# Patient Record
Sex: MALE | NOT HISPANIC OR LATINO | URBAN - METROPOLITAN AREA
[De-identification: names, ages, dates, MRNs, and addresses within clinical notes are randomized per-mention and may not be internally consistent; named-entity substitution may affect disease eponyms.]

---

## 2017-09-11 ENCOUNTER — OFFICE VISIT (OUTPATIENT)
Dept: INTERNAL MEDICINE | Facility: CLINIC | Age: 42
End: 2017-09-11

## 2017-09-11 VITALS
WEIGHT: 194 LBS | TEMPERATURE: 97.9 F | SYSTOLIC BLOOD PRESSURE: 126 MMHG | DIASTOLIC BLOOD PRESSURE: 84 MMHG | HEIGHT: 66 IN | BODY MASS INDEX: 31.18 KG/M2

## 2017-09-11 DIAGNOSIS — F41.9 ANXIETY AND DEPRESSION: Primary | ICD-10-CM

## 2017-09-11 DIAGNOSIS — F41.0 PANIC ATTACK: ICD-10-CM

## 2017-09-11 DIAGNOSIS — F32.A ANXIETY AND DEPRESSION: Primary | ICD-10-CM

## 2017-09-11 PROCEDURE — 99214 OFFICE O/P EST MOD 30 MIN: CPT | Performed by: INTERNAL MEDICINE

## 2017-09-11 RX ORDER — HYDROXYZINE PAMOATE 25 MG/1
25 CAPSULE ORAL DAILY PRN
Qty: 30 CAPSULE | Refills: 2 | Status: SHIPPED | OUTPATIENT
Start: 2017-09-11 | End: 2017-12-01 | Stop reason: SDUPTHER

## 2017-09-11 RX ORDER — BUPROPION HYDROCHLORIDE 150 MG/1
150 TABLET ORAL DAILY
Qty: 30 TABLET | Refills: 2 | Status: SHIPPED | OUTPATIENT
Start: 2017-09-11 | End: 2017-12-01
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
## 2017-09-11 NOTE — PATIENT INSTRUCTIONS
Hydroxyzine capsules or tablets  What is this medicine?  HYDROXYZINE (nia DROX i zeen) is an antihistamine. This medicine is used to treat allergy symptoms. It is also used to treat anxiety and tension. This medicine can be used with other medicines to induce sleep before surgery.  This medicine may be used for other purposes; ask your health care provider or pharmacist if you have questions.  COMMON BRAND NAME(S): ANX, Atarax, Rezine, Vistaril  What should I tell my health care provider before I take this medicine?  They need to know if you have any of these conditions:  -any chronic illness  -difficulty passing urine  -glaucoma  -heart disease  -kidney disease  -liver disease  -lung disease  -an unusual or allergic reaction to hydroxyzine, cetirizine, other medicines, foods, dyes, or preservatives  -pregnant or trying to get pregnant  -breast-feeding  How should I use this medicine?  Take this medicine by mouth with a full glass of water. Follow the directions on the prescription label. You may take this medicine with food or on an empty stomach. Take your medicine at regular intervals. Do not take your medicine more often than directed.  Talk to your pediatrician regarding the use of this medicine in children. Special care may be needed. While this drug may be prescribed for children as young as 6 years of age for selected conditions, precautions do apply.  Patients over 65 years old may have a stronger reaction and need a smaller dose.  Overdosage: If you think you have taken too much of this medicine contact a poison control center or emergency room at once.  NOTE: This medicine is only for you. Do not share this medicine with others.  What if I miss a dose?  If you miss a dose, take it as soon as you can. If it is almost time for your next dose, take only that dose. Do not take double or extra doses.  What may interact with this medicine?  -alcohol  -barbiturate medicines for sleep or seizures  -medicines for  colds, allergies  -medicines for depression, anxiety, or emotional disturbances  -medicines for pain  -medicines for sleep  -muscle relaxants  This list may not describe all possible interactions. Give your health care provider a list of all the medicines, herbs, non-prescription drugs, or dietary supplements you use. Also tell them if you smoke, drink alcohol, or use illegal drugs. Some items may interact with your medicine.  What should I watch for while using this medicine?  Tell your doctor or health care professional if your symptoms do not improve.  You may get drowsy or dizzy. Do not drive, use machinery, or do anything that needs mental alertness until you know how this medicine affects you. Do not stand or sit up quickly, especially if you are an older patient. This reduces the risk of dizzy or fainting spells. Alcohol may interfere with the effect of this medicine. Avoid alcoholic drinks.  Your mouth may get dry. Chewing sugarless gum or sucking hard candy, and drinking plenty of water may help. Contact your doctor if the problem does not go away or is severe.  This medicine may cause dry eyes and blurred vision. If you wear contact lenses you may feel some discomfort. Lubricating drops may help. See your eye doctor if the problem does not go away or is severe.  If you are receiving skin tests for allergies, tell your doctor you are using this medicine.  What side effects may I notice from receiving this medicine?  Side effects that you should report to your doctor or health care professional as soon as possible:  -fast or irregular heartbeat  -difficulty passing urine  -seizures  -slurred speech or confusion  -tremor  Side effects that usually do not require medical attention (report to your doctor or health care professional if they continue or are bothersome):  -constipation  -drowsiness  -fatigue  -headache  -stomach upset  This list may not describe all possible side effects. Call your doctor for  medical advice about side effects. You may report side effects to FDA at 0-598-TEX-0666.  Where should I keep my medicine?  Keep out of the reach of children.  Store at room temperature between 15 and 30 degrees C (59 and 86 degrees F). Keep container tightly closed. Throw away any unused medicine after the expiration date.  NOTE: This sheet is a summary. It may not cover all possible information. If you have questions about this medicine, talk to your doctor, pharmacist, or health care provider.     © 2017, [employer]. (2009-05-01 14:50:59)

## 2017-10-23 ENCOUNTER — OFFICE VISIT (OUTPATIENT)
Dept: INTERNAL MEDICINE | Facility: CLINIC | Age: 42
End: 2017-10-23

## 2017-10-23 VITALS
TEMPERATURE: 98 F | BODY MASS INDEX: 31.02 KG/M2 | DIASTOLIC BLOOD PRESSURE: 68 MMHG | WEIGHT: 193 LBS | SYSTOLIC BLOOD PRESSURE: 117 MMHG | HEIGHT: 66 IN

## 2017-10-23 DIAGNOSIS — F32.A ANXIETY AND DEPRESSION: Primary | ICD-10-CM

## 2017-10-23 DIAGNOSIS — F41.9 ANXIETY AND DEPRESSION: Primary | ICD-10-CM

## 2017-10-23 DIAGNOSIS — F41.0 PANIC ATTACK: ICD-10-CM

## 2017-10-23 PROCEDURE — 99214 OFFICE O/P EST MOD 30 MIN: CPT | Performed by: INTERNAL MEDICINE

## 2017-10-23 RX ORDER — BUSPIRONE HYDROCHLORIDE 10 MG/1
10 TABLET ORAL 2 TIMES DAILY
Qty: 60 TABLET | Refills: 2 | Status: SHIPPED | OUTPATIENT
Start: 2017-10-23 | End: 2017-12-01 | Stop reason: SDUPTHER
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
## 2017-10-23 NOTE — PATIENT INSTRUCTIONS
Buspirone tablets  What is this medicine?  BUSPIRONE (byoo SKYLER aldana) is used to treat anxiety disorders.  This medicine may be used for other purposes; ask your health care provider or pharmacist if you have questions.  COMMON BRAND NAME(S): Lilly  What should I tell my health care provider before I take this medicine?  They need to know if you have any of these conditions:  -kidney or liver disease  -an unusual or allergic reaction to buspirone, other medicines, foods, dyes, or preservatives  -pregnant or trying to get pregnant  -breast-feeding  How should I use this medicine?  Take this medicine by mouth with a glass of water. Follow the directions on the prescription label. You may take this medicine with or without food. To ensure that this medicine always works the same way for you, you should take it either always with or always without food. Take your doses at regular intervals. Do not take your medicine more often than directed. Do not stop taking except on the advice of your doctor or health care professional.  Talk to your pediatrician regarding the use of this medicine in children. Special care may be needed.  Overdosage: If you think you have taken too much of this medicine contact a poison control center or emergency room at once.  NOTE: This medicine is only for you. Do not share this medicine with others.  What if I miss a dose?  If you miss a dose, take it as soon as you can. If it is almost time for your next dose, take only that dose. Do not take double or extra doses.  What may interact with this medicine?  Do not take this medicine with any of the following medications:  -linezolid  -MAOIs like Carbex, Eldepryl, Marplan, Nardil, and Parnate  -methylene blue  -procarbazine  This medicine may also interact with the following medications:  -diazepam  -digoxin  -diltiazem  -erythromycin  -grapefruit juice  -haloperidol  -medicines for mental depression or mood problems  -medicines for seizures like  carbamazepine, phenobarbital and phenytoin  -nefazodone  -other medications for anxiety  -rifampin  -ritonavir  -some antifungal medicines like itraconazole, ketoconazole, and voriconazole  -verapamil  -warfarin  This list may not describe all possible interactions. Give your health care provider a list of all the medicines, herbs, non-prescription drugs, or dietary supplements you use. Also tell them if you smoke, drink alcohol, or use illegal drugs. Some items may interact with your medicine.  What should I watch for while using this medicine?  Visit your doctor or health care professional for regular checks on your progress. It may take 1 to 2 weeks before your anxiety gets better.  You may get drowsy or dizzy. Do not drive, use machinery, or do anything that needs mental alertness until you know how this drug affects you. Do not stand or sit up quickly, especially if you are an older patient. This reduces the risk of dizzy or fainting spells. Alcohol can make you more drowsy and dizzy. Avoid alcoholic drinks.  What side effects may I notice from receiving this medicine?  Side effects that you should report to your doctor or health care professional as soon as possible:  -blurred vision or other vision changes  -chest pain  -confusion  -difficulty breathing  -feelings of hostility or anger  -muscle aches and pains  -numbness or tingling in hands or feet  -ringing in the ears  -skin rash and itching  -vomiting  -weakness  Side effects that usually do not require medical attention (report to your doctor or health care professional if they continue or are bothersome):  -disturbed dreams, nightmares  -headache  -nausea  -restlessness or nervousness  -sore throat and nasal congestion  -stomach upset  This list may not describe all possible side effects. Call your doctor for medical advice about side effects. You may report side effects to FDA at 5-853-FDA-1499.  Where should I keep my medicine?  Keep out of the reach  of children.  Store at room temperature below 30 degrees C (86 degrees F). Protect from light. Keep container tightly closed. Throw away any unused medicine after the expiration date.  NOTE: This sheet is a summary. It may not cover all possible information. If you have questions about this medicine, talk to your doctor, pharmacist, or health care provider.     © 2017, [employer]. (2011-07-28 18:06:11)

## 2017-12-01 ENCOUNTER — OFFICE VISIT (OUTPATIENT)
Dept: INTERNAL MEDICINE | Facility: CLINIC | Age: 42
End: 2017-12-01

## 2017-12-01 VITALS
TEMPERATURE: 98.1 F | SYSTOLIC BLOOD PRESSURE: 117 MMHG | BODY MASS INDEX: 31.98 KG/M2 | WEIGHT: 199 LBS | HEIGHT: 66 IN | DIASTOLIC BLOOD PRESSURE: 68 MMHG

## 2017-12-01 DIAGNOSIS — F32.A ANXIETY AND DEPRESSION: Primary | ICD-10-CM

## 2017-12-01 DIAGNOSIS — F41.0 PANIC ATTACK: ICD-10-CM

## 2017-12-01 DIAGNOSIS — F41.9 ANXIETY AND DEPRESSION: Primary | ICD-10-CM

## 2017-12-01 DIAGNOSIS — Z23 INFLUENZA VACCINATION ADMINISTERED AT CURRENT VISIT: ICD-10-CM

## 2017-12-01 PROCEDURE — 90471 IMMUNIZATION ADMIN: CPT | Performed by: INTERNAL MEDICINE

## 2017-12-01 PROCEDURE — 90686 IIV4 VACC NO PRSV 0.5 ML IM: CPT | Performed by: INTERNAL MEDICINE

## 2017-12-01 PROCEDURE — 99214 OFFICE O/P EST MOD 30 MIN: CPT | Performed by: INTERNAL MEDICINE

## 2017-12-01 RX ORDER — BUPROPION HYDROCHLORIDE 300 MG/1
300 TABLET ORAL DAILY
Qty: 90 TABLET | Refills: 2 | Status: SHIPPED | OUTPATIENT
Start: 2017-12-01 | End: 2018-09-23 | Stop reason: SDUPTHER

## 2017-12-01 RX ORDER — BUSPIRONE HYDROCHLORIDE 10 MG/1
10 TABLET ORAL 2 TIMES DAILY
Qty: 180 TABLET | Refills: 2 | Status: SHIPPED | OUTPATIENT
Start: 2017-12-01

## 2017-12-01 RX ORDER — HYDROXYZINE PAMOATE 25 MG/1
25 CAPSULE ORAL DAILY PRN
Qty: 90 CAPSULE | Refills: 2 | Status: SHIPPED | OUTPATIENT
Start: 2017-12-01 | End: 2018-12-04

## 2017-12-01 NOTE — PROGRESS NOTES
"Subjective   Hiram Arreguin is a 42 y.o. male here for follow-up A&D. Says mood overall feels stable, depression is stable, anxiety is still present but improving. He has bad days some times. Thinks wellbutrin could be increased a bit for further mood life. He has had no negative SE from these meds. Work is still a stressor. Father dx with colon cancer (or maybe metastatic prostate cancer to the colon?) at 65 yo and he is wondering when he should start getting screened for cancer. Wants flu vaccine today.     The following portions of the patient's history were reviewed and updated as appropriate: allergies, current medications, past family history, past medical history, past social history and problem list.     Family: colon cancer in father diagnosed at 65yo    Review of Systems:  General: negative  CV: negative  Neuro: negative  Psych: A&D    Objective   /68 (BP Location: Right arm, Patient Position: Sitting, Cuff Size: Adult)  Temp 98.1 °F (36.7 °C) (Temporal Artery )   Ht 66\" (167.6 cm)  Wt 199 lb (90.3 kg)  BMI 32.12 kg/m2    Physical Exam   Constitutional: He is oriented to person, place, and time. He appears well-developed and well-nourished.   Pulmonary/Chest: Effort normal.   Neurological: He is alert and oriented to person, place, and time.   Skin: Skin is warm and dry.   Psychiatric: He has a normal mood and affect. His behavior is normal. Judgment and thought content normal.   Vitals reviewed.      Assessment/Plan   Hiram was seen today for anxiety and depression.    Diagnoses and all orders for this visit:    Anxiety and depression  -     buPROPion XL (WELLBUTRIN XL) 300 MG 24 hr tablet; Take 1 tablet by mouth Daily. Increased dose, hopefully will lift mood a bit more  -     busPIRone (BUSPAR) 10 MG tablet; Take 1 tablet by mouth 2 (Two) Times a Day.   -     hydrOXYzine (VISTARIL) 25 MG capsule; Take 1 capsule by mouth Daily As Needed for Anxiety.    Panic attack  -     buPROPion XL " (WELLBUTRIN XL) 300 MG 24 hr tablet; Take 1 tablet by mouth Daily.  -     busPIRone (BUSPAR) 10 MG tablet; Take 1 tablet by mouth 2 (Two) Times a Day.  -     hydrOXYzine (VISTARIL) 25 MG capsule; Take 1 capsule by mouth Daily As Needed for Anxiety.    Influenza vaccination administered at current visit  -     Flu Vaccine Quad PF 3YR+ (FLUARIX [id number])

## 2017-12-01 NOTE — PATIENT INSTRUCTIONS
Buspirone tablets  What is this medicine?  BUSPIRONE (byoo SKYLER aldana) is used to treat anxiety disorders.  This medicine may be used for other purposes; ask your health care provider or pharmacist if you have questions.  COMMON BRAND NAME(S): Lilly  What should I tell my health care provider before I take this medicine?  They need to know if you have any of these conditions:  -kidney or liver disease  -an unusual or allergic reaction to buspirone, other medicines, foods, dyes, or preservatives  -pregnant or trying to get pregnant  -breast-feeding  How should I use this medicine?  Take this medicine by mouth with a glass of water. Follow the directions on the prescription label. You may take this medicine with or without food. To ensure that this medicine always works the same way for you, you should take it either always with or always without food. Take your doses at regular intervals. Do not take your medicine more often than directed. Do not stop taking except on the advice of your doctor or health care professional.  Talk to your pediatrician regarding the use of this medicine in children. Special care may be needed.  Overdosage: If you think you have taken too much of this medicine contact a poison control center or emergency room at once.  NOTE: This medicine is only for you. Do not share this medicine with others.  What if I miss a dose?  If you miss a dose, take it as soon as you can. If it is almost time for your next dose, take only that dose. Do not take double or extra doses.  What may interact with this medicine?  Do not take this medicine with any of the following medications:  -linezolid  -MAOIs like Carbex, Eldepryl, Marplan, Nardil, and Parnate  -methylene blue  -procarbazine  This medicine may also interact with the following medications:  -diazepam  -digoxin  -diltiazem  -erythromycin  -grapefruit juice  -haloperidol  -medicines for mental depression or mood problems  -medicines for seizures like  carbamazepine, phenobarbital and phenytoin  -nefazodone  -other medications for anxiety  -rifampin  -ritonavir  -some antifungal medicines like itraconazole, ketoconazole, and voriconazole  -verapamil  -warfarin  This list may not describe all possible interactions. Give your health care provider a list of all the medicines, herbs, non-prescription drugs, or dietary supplements you use. Also tell them if you smoke, drink alcohol, or use illegal drugs. Some items may interact with your medicine.  What should I watch for while using this medicine?  Visit your doctor or health care professional for regular checks on your progress. It may take 1 to 2 weeks before your anxiety gets better.  You may get drowsy or dizzy. Do not drive, use machinery, or do anything that needs mental alertness until you know how this drug affects you. Do not stand or sit up quickly, especially if you are an older patient. This reduces the risk of dizzy or fainting spells. Alcohol can make you more drowsy and dizzy. Avoid alcoholic drinks.  What side effects may I notice from receiving this medicine?  Side effects that you should report to your doctor or health care professional as soon as possible:  -blurred vision or other vision changes  -chest pain  -confusion  -difficulty breathing  -feelings of hostility or anger  -muscle aches and pains  -numbness or tingling in hands or feet  -ringing in the ears  -skin rash and itching  -vomiting  -weakness  Side effects that usually do not require medical attention (report to your doctor or health care professional if they continue or are bothersome):  -disturbed dreams, nightmares  -headache  -nausea  -restlessness or nervousness  -sore throat and nasal congestion  -stomach upset  This list may not describe all possible side effects. Call your doctor for medical advice about side effects. You may report side effects to FDA at 0-667-FDA-1139.  Where should I keep my medicine?  Keep out of the reach  of children.  Store at room temperature below 30 degrees C (86 degrees F). Protect from light. Keep container tightly closed. Throw away any unused medicine after the expiration date.  NOTE: This sheet is a summary. It may not cover all possible information. If you have questions about this medicine, talk to your doctor, pharmacist, or health care provider.     © 2017, [employer]. (2011-07-28 18:06:11)

## 2018-09-18 ENCOUNTER — OFFICE VISIT (OUTPATIENT)
Dept: INTERNAL MEDICINE | Facility: CLINIC | Age: 43
End: 2018-09-18

## 2018-09-18 VITALS — BODY MASS INDEX: 29.09 KG/M2 | TEMPERATURE: 97.6 F | HEIGHT: 66 IN | WEIGHT: 181 LBS

## 2018-09-18 DIAGNOSIS — F51.04 CHRONIC INSOMNIA: Primary | ICD-10-CM

## 2018-09-18 DIAGNOSIS — Z23 INFLUENZA VACCINATION ADMINISTERED AT CURRENT VISIT: ICD-10-CM

## 2018-09-18 PROCEDURE — 90686 IIV4 VACC NO PRSV 0.5 ML IM: CPT | Performed by: INTERNAL MEDICINE

## 2018-09-18 PROCEDURE — 99213 OFFICE O/P EST LOW 20 MIN: CPT | Performed by: INTERNAL MEDICINE

## 2018-09-18 PROCEDURE — 90471 IMMUNIZATION ADMIN: CPT | Performed by: INTERNAL MEDICINE

## 2018-09-18 RX ORDER — TRAZODONE HYDROCHLORIDE 50 MG/1
50 TABLET ORAL NIGHTLY
Qty: 30 TABLET | Refills: 5 | Status: SHIPPED | OUTPATIENT
Start: 2018-09-18

## 2018-09-18 NOTE — PROGRESS NOTES
"Subjective   Hiram Arreguin is a 43 y.o. male here for insomnia which he has suffered with chronically. However, last 2 months he is only getting about 4 hours of sleep; generally does well with 6 hours. He doesn't feel tired. He has difficulty going to sleep. Lays down at 10pm, doesn't get to sleep until 1am though he turns lights out, no devices, listens to soothing music. He then wakes at 5am with no alarm wide awake. He is afraid that long term this is going to affect him. Has taken melatonin, vistaril with no effect. Long ago tried ambien but doesn't want to take that. Feels his mood is great.       The following portions of the patient's history were reviewed and updated as appropriate: allergies, current medications, past medical history, past social history and problem list.    Review of Systems:  General: negative  Neuro: insomnia  Psych: negative    Objective   Temp 97.6 °F (36.4 °C) (Temporal Artery )   Ht 167.6 cm (66\")   Wt 82.1 kg (181 lb)   BMI 29.21 kg/m²     Physical Exam   Constitutional: He is oriented to person, place, and time. He appears well-developed and well-nourished.   Pulmonary/Chest: Effort normal.   Neurological: He is alert and oriented to person, place, and time.   Skin: Skin is warm and dry.   Psychiatric: He has a normal mood and affect. His behavior is normal. Judgment and thought content normal.   Vitals reviewed.      Assessment/Plan   Hiram was seen today for insomnia.    Diagnoses and all orders for this visit:    Chronic insomnia  -     traZODone (DESYREL) 50 MG tablet; Take 1 tablet by mouth Every Night. New med. Discussed common SE, what to expect. Discussed priapism risk and if erection >4h go to ED  -     Discussed other options such as vistaril, melatonin, seroquel, in addition to trazodone. Will go with trazodone.    Influenza vaccination administered at current visit  -     Fluarix/Fluzone/Afluria Quad>6 mo ([id number])           "

## 2018-09-23 DIAGNOSIS — F41.9 ANXIETY AND DEPRESSION: ICD-10-CM

## 2018-09-23 DIAGNOSIS — F41.0 PANIC ATTACK: ICD-10-CM

## 2018-09-23 DIAGNOSIS — F32.A ANXIETY AND DEPRESSION: ICD-10-CM

## 2018-09-24 RX ORDER — BUPROPION HYDROCHLORIDE 300 MG/1
TABLET ORAL
Qty: 90 TABLET | Refills: 2 | Status: SHIPPED | OUTPATIENT
Start: 2018-09-24 | End: 2018-12-04

## 2018-12-04 ENCOUNTER — OFFICE VISIT (OUTPATIENT)
Dept: INTERNAL MEDICINE | Facility: CLINIC | Age: 43
End: 2018-12-04

## 2018-12-04 VITALS
DIASTOLIC BLOOD PRESSURE: 70 MMHG | BODY MASS INDEX: 30.05 KG/M2 | TEMPERATURE: 97.8 F | WEIGHT: 187 LBS | SYSTOLIC BLOOD PRESSURE: 122 MMHG | HEIGHT: 66 IN

## 2018-12-04 DIAGNOSIS — F41.9 ANXIETY AND DEPRESSION: Primary | ICD-10-CM

## 2018-12-04 DIAGNOSIS — F41.0 PANIC ATTACK: ICD-10-CM

## 2018-12-04 DIAGNOSIS — F32.A ANXIETY AND DEPRESSION: Primary | ICD-10-CM

## 2018-12-04 DIAGNOSIS — F51.04 CHRONIC INSOMNIA: ICD-10-CM

## 2018-12-04 LAB
25(OH)D3 SERPL-MCNC: 13.8 NG/ML
ALBUMIN SERPL-MCNC: 4.7 G/DL (ref 3.2–4.8)
ALBUMIN/GLOB SERPL: 1.7 G/DL (ref 1.5–2.5)
ALP SERPL-CCNC: 69 U/L (ref 25–100)
ALT SERPL W P-5'-P-CCNC: 40 U/L (ref 7–40)
ANION GAP SERPL CALCULATED.3IONS-SCNC: 5 MMOL/L (ref 3–11)
AST SERPL-CCNC: 31 U/L (ref 0–33)
BILIRUB SERPL-MCNC: 0.9 MG/DL (ref 0.3–1.2)
BUN BLD-MCNC: 13 MG/DL (ref 9–23)
BUN/CREAT SERPL: 12.7 (ref 7–25)
CALCIUM SPEC-SCNC: 9 MG/DL (ref 8.7–10.4)
CHLORIDE SERPL-SCNC: 108 MMOL/L (ref 99–109)
CO2 SERPL-SCNC: 30 MMOL/L (ref 20–31)
CREAT BLD-MCNC: 1.02 MG/DL (ref 0.6–1.3)
DEPRECATED RDW RBC AUTO: 47.4 FL (ref 37–54)
ERYTHROCYTE [DISTWIDTH] IN BLOOD BY AUTOMATED COUNT: 13.5 % (ref 11.3–14.5)
GFR SERPL CREATININE-BSD FRML MDRD: 80 ML/MIN/1.73
GFR SERPL CREATININE-BSD FRML MDRD: 97 ML/MIN/1.73
GLOBULIN UR ELPH-MCNC: 2.7 GM/DL
GLUCOSE BLD-MCNC: 85 MG/DL (ref 70–100)
HCT VFR BLD AUTO: 44.1 % (ref 38.9–50.9)
HGB BLD-MCNC: 14.4 G/DL (ref 13.1–17.5)
MCH RBC QN AUTO: 31.4 PG (ref 27–31)
MCHC RBC AUTO-ENTMCNC: 32.7 G/DL (ref 32–36)
MCV RBC AUTO: 96.3 FL (ref 80–99)
PLATELET # BLD AUTO: 178 10*3/MM3 (ref 150–450)
PMV BLD AUTO: 9.4 FL (ref 6–12)
POTASSIUM BLD-SCNC: 3.8 MMOL/L (ref 3.5–5.5)
PROT SERPL-MCNC: 7.4 G/DL (ref 5.7–8.2)
RBC # BLD AUTO: 4.58 10*6/MM3 (ref 4.2–5.76)
SODIUM BLD-SCNC: 143 MMOL/L (ref 132–146)
TSH SERPL DL<=0.05 MIU/L-ACNC: 0.63 MIU/ML (ref 0.35–5.35)
VIT B12 BLD-MCNC: 519 PG/ML (ref 211–911)
WBC NRBC COR # BLD: 6.16 10*3/MM3 (ref 3.5–10.8)

## 2018-12-04 PROCEDURE — 80053 COMPREHEN METABOLIC PANEL: CPT | Performed by: INTERNAL MEDICINE

## 2018-12-04 PROCEDURE — 99214 OFFICE O/P EST MOD 30 MIN: CPT | Performed by: INTERNAL MEDICINE

## 2018-12-04 PROCEDURE — 85027 COMPLETE CBC AUTOMATED: CPT | Performed by: INTERNAL MEDICINE

## 2018-12-04 PROCEDURE — 84443 ASSAY THYROID STIM HORMONE: CPT | Performed by: INTERNAL MEDICINE

## 2018-12-04 PROCEDURE — 82306 VITAMIN D 25 HYDROXY: CPT | Performed by: INTERNAL MEDICINE

## 2018-12-04 PROCEDURE — 82607 VITAMIN B-12: CPT | Performed by: INTERNAL MEDICINE

## 2018-12-04 RX ORDER — BUPROPION HYDROCHLORIDE 150 MG/1
150 TABLET ORAL DAILY
Qty: 30 TABLET | Refills: 2 | Status: SHIPPED | OUTPATIENT
Start: 2018-12-04

## 2018-12-04 RX ORDER — HYDROXYZINE PAMOATE 25 MG/1
25-50 CAPSULE ORAL DAILY PRN
Qty: 60 CAPSULE | Refills: 0 | Status: SHIPPED | OUTPATIENT
Start: 2018-12-04

## 2018-12-04 NOTE — PROGRESS NOTES
"Subjective   Hiram Arreguin is a 43 y.o. male here for follow-up A&D, panic attack, and insomnia. He has difficulty characterizing his sx. He says last week he felt \"something coming on\" and he woke the next day and called in to work. He felt very depressed. He just wanted to be alone and laid on the couch. He didn't work the entire week. He even felt so disconnected that he thought perhaps he should just go on disability because he can't deal with what's going on with him. 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 He also felt anxious. No panic attacks though has hx of that. Continue wellbutrin, buspar, and newly on trazodone for insomnia. He isn't sure if it's med effect. He went to [facility] and is seeing a therapist, due to see psychiatrist on dec 19th. He wants to continue work during this time but needs some accommodations. He does feel a bit better this week but not back to normal.  He has fam hx of social anxiety disorder, bipolar, A&D. He denies true margaret but says he does have days where \"everything seems vibrant\" and he really enjoys those days.      The following portions of the patient's history were reviewed and updated as appropriate: allergies, current medications, past family history, past medical history, past social history, past surgical history and problem list.    Review of Systems:  General: negative  CV: negative  Respiratory: negative  Neuro: negative  Psych: see hpi    Objective   /70 (BP Location: Left arm, Patient Position: Sitting, Cuff Size: Adult)   Temp 97.8 °F (36.6 °C) (Temporal)   Ht 167.6 cm (66\")   Wt 84.8 kg (187 lb)   BMI 30.18 kg/m²     Physical Exam   Constitutional: He is oriented to person, place, and time. He appears well-developed and well-nourished.   Pulmonary/Chest: Effort normal.   Neurological: He is alert and oriented to person, place, and time.   Skin: Skin is warm and dry.   Psychiatric: His behavior is normal. Judgment and " thought content normal.   anxious   Vitals reviewed.      Assessment/Plan   Hiram was seen today for anxiety and depression.    Diagnoses and all orders for this visit:    Anxiety and depression  -     CBC (No Diff)  -     Comprehensive Metabolic Panel  -     Vitamin B12  -     Vitamin D 25 Hydroxy  -     TSH  -     buPROPion XL (WELLBUTRIN XL) 150 MG 24 hr tablet; Take 1 tablet by mouth Daily. Half dose.  -     Worsening chronic problem, needs add'l workup    Panic attack  -     CBC (No Diff)  -     Comprehensive Metabolic Panel  -     Vitamin B12  -     Vitamin D 25 Hydroxy  -     TSH  -      Continue buspar    Chronic insomnia  -     hydrOXYzine (VISTARIL) 25 MG capsule; Take 1-2 capsules by mouth Daily As Needed (insomnia). Stop trazodone.